# Patient Record
Sex: FEMALE | Race: WHITE | URBAN - METROPOLITAN AREA
[De-identification: names, ages, dates, MRNs, and addresses within clinical notes are randomized per-mention and may not be internally consistent; named-entity substitution may affect disease eponyms.]

---

## 2018-10-05 ENCOUNTER — OFFICE VISIT (OUTPATIENT)
Dept: SURGERY | Age: 43
End: 2018-10-05

## 2018-10-05 VITALS
SYSTOLIC BLOOD PRESSURE: 118 MMHG | WEIGHT: 130 LBS | DIASTOLIC BLOOD PRESSURE: 68 MMHG | BODY MASS INDEX: 23.04 KG/M2 | HEIGHT: 63 IN

## 2018-10-05 DIAGNOSIS — R23.8 FACIAL AGING: Primary | ICD-10-CM

## 2018-10-05 PROCEDURE — MISCPNC PLASTICS VISIT NO CHARGE: Performed by: PLASTIC SURGERY

## 2018-10-05 RX ORDER — BUSPIRONE HYDROCHLORIDE 5 MG/1
5 TABLET ORAL 3 TIMES DAILY
COMMUNITY

## 2018-10-05 NOTE — PROGRESS NOTES
thereby gain filling of the soft tissues. The product did not contain xylocaine within. The injected areas were gently massaged. The patient tolerated the procedure well without complications. The patient was educated to keep the head elevated at least 30 degrees for the remainder of the day, and was educated to apply a cold compress to the injected areas for 15 minutes on a 15 minutes off as desired to counteract swelling. The patient was educated that bruising could last from 10 days to 2 weeks. Makeup can be applied beginning the following day. Follow up in 2 weeks or sooner with any concerns. I attest that the patient was seen and examined by me, and concur with the documentation above. I agree with the assessment and the plan outlined. This document is generated, in part, by voice recognition software and thus  syntax and grammatical errors are possible.     Chitra Hernandez  10:07 AM  10/5/2018